# Patient Record
Sex: FEMALE | Race: WHITE | NOT HISPANIC OR LATINO | Employment: OTHER | URBAN - NONMETROPOLITAN AREA
[De-identification: names, ages, dates, MRNs, and addresses within clinical notes are randomized per-mention and may not be internally consistent; named-entity substitution may affect disease eponyms.]

---

## 2023-04-25 ENCOUNTER — HOSPITAL ENCOUNTER (EMERGENCY)
Facility: HOSPITAL | Age: 73
Discharge: HOME | End: 2023-04-25
Attending: EMERGENCY MEDICINE | Admitting: EMERGENCY MEDICINE

## 2023-04-25 VITALS
RESPIRATION RATE: 18 BRPM | HEART RATE: 91 BPM | DIASTOLIC BLOOD PRESSURE: 69 MMHG | TEMPERATURE: 97.4 F | HEIGHT: 65 IN | BODY MASS INDEX: 24.99 KG/M2 | WEIGHT: 150 LBS | SYSTOLIC BLOOD PRESSURE: 113 MMHG | OXYGEN SATURATION: 95 %

## 2023-04-25 DIAGNOSIS — R21 RASH: ICD-10-CM

## 2023-04-25 DIAGNOSIS — R19.7 VOMITING AND DIARRHEA: Primary | ICD-10-CM

## 2023-04-25 DIAGNOSIS — R11.10 VOMITING AND DIARRHEA: Primary | ICD-10-CM

## 2023-04-25 LAB
ALBUMIN SERPL BCP-MCNC: 4.6 G/DL (ref 3.5–5)
ALP SERPL-CCNC: 78 U/L (ref 34–104)
ALT SERPL W P-5'-P-CCNC: 12 U/L (ref 7–52)
ANION GAP SERPL CALCULATED.3IONS-SCNC: 8 MMOL/L (ref 4–13)
AST SERPL W P-5'-P-CCNC: 19 U/L (ref 13–39)
BASOPHILS # BLD AUTO: 0.01 THOUSANDS/ΜL (ref 0–0.1)
BASOPHILS NFR BLD AUTO: 0 % (ref 0–1)
BILIRUB SERPL-MCNC: 0.55 MG/DL (ref 0.2–1)
BUN SERPL-MCNC: 26 MG/DL (ref 5–25)
CALCIUM SERPL-MCNC: 8.9 MG/DL (ref 8.4–10.2)
CARDIAC TROPONIN I PNL SERPL HS: <2 NG/L
CHLORIDE SERPL-SCNC: 108 MMOL/L (ref 96–108)
CO2 SERPL-SCNC: 19 MMOL/L (ref 21–32)
CREAT SERPL-MCNC: 1 MG/DL (ref 0.6–1.3)
CRP SERPL QL: 12.9 MG/L
EOSINOPHIL # BLD AUTO: 0.02 THOUSAND/ΜL (ref 0–0.61)
EOSINOPHIL NFR BLD AUTO: 0 % (ref 0–6)
ERYTHROCYTE [DISTWIDTH] IN BLOOD BY AUTOMATED COUNT: 12.8 % (ref 11.6–15.1)
FLUAV RNA RESP QL NAA+PROBE: NEGATIVE
FLUBV RNA RESP QL NAA+PROBE: NEGATIVE
GFR SERPL CREATININE-BSD FRML MDRD: 55 ML/MIN/1.73SQ M
GLUCOSE SERPL-MCNC: 137 MG/DL (ref 65–140)
HCT VFR BLD AUTO: 50.1 % (ref 34.8–46.1)
HGB BLD-MCNC: 15.9 G/DL (ref 11.5–15.4)
HOLD SPECIMEN: NORMAL
IMM GRANULOCYTES # BLD AUTO: 0.03 THOUSAND/UL (ref 0–0.2)
IMM GRANULOCYTES NFR BLD AUTO: 0 % (ref 0–2)
LACTATE SERPL-SCNC: 0.8 MMOL/L (ref 0.5–2)
LIPASE SERPL-CCNC: 32 U/L (ref 11–82)
LYMPHOCYTES # BLD AUTO: 0.33 THOUSANDS/ΜL (ref 0.6–4.47)
LYMPHOCYTES NFR BLD AUTO: 3 % (ref 14–44)
MCH RBC QN AUTO: 29.9 PG (ref 26.8–34.3)
MCHC RBC AUTO-ENTMCNC: 31.7 G/DL (ref 31.4–37.4)
MCV RBC AUTO: 94 FL (ref 82–98)
MONOCYTES # BLD AUTO: 0.59 THOUSAND/ΜL (ref 0.17–1.22)
MONOCYTES NFR BLD AUTO: 5 % (ref 4–12)
NEUTROPHILS # BLD AUTO: 10.22 THOUSANDS/ΜL (ref 1.85–7.62)
NEUTS SEG NFR BLD AUTO: 92 % (ref 43–75)
NRBC BLD AUTO-RTO: 0 /100 WBCS
PLATELET # BLD AUTO: 191 THOUSANDS/UL (ref 149–390)
PMV BLD AUTO: 11.3 FL (ref 8.9–12.7)
POTASSIUM SERPL-SCNC: 4.5 MMOL/L (ref 3.5–5.3)
PROCALCITONIN SERPL-MCNC: 0.2 NG/ML
PROT SERPL-MCNC: 7.7 G/DL (ref 6.4–8.4)
RBC # BLD AUTO: 5.31 MILLION/UL (ref 3.81–5.12)
RSV RNA RESP QL NAA+PROBE: NEGATIVE
SARS-COV-2 RNA RESP QL NAA+PROBE: NEGATIVE
SODIUM SERPL-SCNC: 135 MMOL/L (ref 135–147)
WBC # BLD AUTO: 11.2 THOUSAND/UL (ref 4.31–10.16)

## 2023-04-25 RX ORDER — ONDANSETRON 2 MG/ML
4 INJECTION INTRAMUSCULAR; INTRAVENOUS ONCE
Status: COMPLETED | OUTPATIENT
Start: 2023-04-25 | End: 2023-04-25

## 2023-04-25 RX ORDER — ONDANSETRON 4 MG/1
4 TABLET, FILM COATED ORAL EVERY 6 HOURS PRN
Qty: 10 TABLET | Refills: 0 | Status: SHIPPED | OUTPATIENT
Start: 2023-04-25

## 2023-04-25 RX ADMIN — ONDANSETRON 4 MG: 2 INJECTION INTRAMUSCULAR; INTRAVENOUS at 15:57

## 2023-04-25 NOTE — ED PROVIDER NOTES
History  Chief Complaint   Patient presents with   • Abdominal Pain     Pt c/o abdominal pain w/nausea vomiting and diarrhea at 0300 today  Denies travel/sob/fevers/cough     42-year-old female describes nausea vomiting and diarrhea that began 3:00 this morning, waking her and having watery brown stool every 30 minutes, 2 episodes of vomiting, tea colored, no abdominal pain, states she feels like she has a fever, but did not check  Also mentions lower abdominal, left hip rash ongoing for the past 2 weeks that she attributes to bedbugs  History provided by:  Patient  Diarrhea  Quality:  Watery  Severity:  Severe  Onset quality:  Sudden  Number of episodes:  20-30  Timing:  Constant  Progression:  Unchanged  Relieved by:  Nothing  Ineffective treatments:  None tried  Associated symptoms: chills and fever    Associated symptoms: no abdominal pain    Risk factors: no sick contacts        None       History reviewed  No pertinent past medical history  History reviewed  No pertinent surgical history  History reviewed  No pertinent family history  I have reviewed and agree with the history as documented  E-Cigarette/Vaping   • E-Cigarette Use Never User      E-Cigarette/Vaping Substances     Social History     Tobacco Use   • Smoking status: Never   • Smokeless tobacco: Never   Vaping Use   • Vaping Use: Never used   Substance Use Topics   • Alcohol use: Never   • Drug use: Never       Review of Systems   Constitutional: Positive for chills and fever  Gastrointestinal: Positive for diarrhea  Negative for abdominal pain  All other systems reviewed and are negative  Physical Exam  Physical Exam  Vitals and nursing note reviewed  Constitutional:       Comments: Pleasant, comfortable-appearing   HENT:      Head: Normocephalic and atraumatic  Mouth/Throat:      Mouth: Mucous membranes are moist       Pharynx: Oropharynx is clear     Eyes:      Conjunctiva/sclera: Conjunctivae normal       Pupils: Pupils are equal, round, and reactive to light  Cardiovascular:      Rate and Rhythm: Normal rate and regular rhythm  Heart sounds: Normal heart sounds  Pulmonary:      Effort: Pulmonary effort is normal       Breath sounds: Normal breath sounds  Abdominal:      General: Bowel sounds are normal  There is no distension  Palpations: Abdomen is soft  Tenderness: There is no abdominal tenderness  Musculoskeletal:         General: No deformity  Cervical back: Neck supple  Skin:     General: Skin is warm and dry  Comments: 3 x 5 ovoid irregular superficial wound left lower abdomen does not appear to cross midline, left hip, proximal thigh with multitude of tiny healed wounds and few superficially open, excoriated, nontender   Neurological:      General: No focal deficit present  Mental Status: She is alert and oriented to person, place, and time  Cranial Nerves: No cranial nerve deficit  Coordination: Coordination normal    Psychiatric:         Behavior: Behavior normal          Thought Content:  Thought content normal          Judgment: Judgment normal              Vital Signs  ED Triage Vitals [04/25/23 1439]   Temperature Pulse Respirations Blood Pressure SpO2   (!) 97 4 °F (36 3 °C) 98 18 95/63 94 %      Temp Source Heart Rate Source Patient Position - Orthostatic VS BP Location FiO2 (%)   Temporal Monitor Sitting Left arm --      Pain Score       10 - Worst Possible Pain           Vitals:    04/25/23 1439 04/25/23 1530 04/25/23 1545   BP: 95/63 114/71 113/69   Pulse: 98 93 91   Patient Position - Orthostatic VS: Sitting           Visual Acuity      ED Medications  Medications   ondansetron (ZOFRAN) injection 4 mg (4 mg Intravenous Given 4/25/23 1557)       Diagnostic Studies  Results Reviewed     Procedure Component Value Units Date/Time    Stool Enteric Bacterial Panel by PCR [[id number]]     Lab Status: No result Specimen: Stool     [place] draw [[id number]] Collected: 04/25/23 1505    Lab Status: Final result Specimen: Blood from Arm, Left Updated: 04/25/23 1701    Narrative: The following orders were created for panel order [place] draw  Procedure                               Abnormality         Status                     ---------                               -----------         ------                     Luis Carwin Top on QXGE[[id number]]                           Final result               Green / Black tube on WRXZ[[id number]]                       Final result                 Please view results for these tests on the individual orders  FLU/RSV/COVID - if FLU/RSV clinically relevant [[id number]]  (Normal) Collected: 04/25/23 1556    Lab Status: Final result Specimen: Nares from Nasopharyngeal Swab Updated: 04/25/23 1646     SARS-CoV-2 Negative     INFLUENZA A PCR Negative     INFLUENZA B PCR Negative     RSV PCR Negative    Narrative:      FOR PEDIATRIC PATIENTS - copy/paste COVID Guidelines URL to browser: [email or web address]    SARS-CoV-2 assay is a Nucleic Acid Amplification assay intended for the  qualitative detection of nucleic acid from SARS-CoV-2 in nasopharyngeal  swabs  Results are for the presumptive identification of SARS-CoV-2 RNA  Positive results are indicative of infection with SARS-CoV-2, the virus  causing COVID-19, but do not rule out bacterial infection or co-infection  with other viruses  Laboratories within the [place] and its  territories are required to report all positive results to the appropriate  public health authorities  Negative results do not preclude SARS-CoV-2  infection and should not be used as the sole basis for treatment or other  patient management decisions  Negative results must be combined with  clinical observations, patient history, and epidemiological information  This test has not been FDA cleared or approved      This test has been authorized by FDA under an Emergency Use Authorization  (EUA)  This test is only authorized for the duration of time the  declaration that circumstances exist justifying the authorization of the  emergency use of an in vitro diagnostic tests for detection of SARS-CoV-2  virus and/or diagnosis of COVID-19 infection under section 564(b)(1) of  the Act, 21 U  S C  287IRF-9(Z)(2), unless the authorization is terminated  or revoked sooner  The test has been validated but independent review by FDA  and CLIA is pending  Test performed using deeplocal GeneXpert: This RT-PCR assay targets N2,  a region unique to SARS-CoV-2  A conserved region in the E-gene was chosen  for pan-Sarbecovirus detection which includes SARS-CoV-2  According to CMS-2020-01-R, this platform meets the definition of high-throughput technology  Virus culture [[id number]] Collected: 04/25/23 1639    Lab Status:  In process Specimen: Abdominal Updated: 04/25/23 1645    HS Troponin 0hr (reflex protocol) [[id number]]  (Normal) Collected: 04/25/23 1556    Lab Status: Final result Specimen: Blood from Line, Venous Updated: 04/25/23 1625     hs TnI 0hr <2 ng/L     Procalcitonin [[id number]]  (Normal) Collected: 04/25/23 1505    Lab Status: Final result Specimen: Blood from Arm, Left Updated: 04/25/23 1624     Procalcitonin 0 20 ng/ml     C-reactive protein [[id number]]  (Abnormal) Collected: 04/25/23 1505    Lab Status: Final result Specimen: Blood from Arm, Left Updated: 04/25/23 1604     CRP 12 9 mg/L     CBC and differential [[id number]]  (Abnormal) Collected: 04/25/23 1505    Lab Status: Final result Specimen: Blood from Arm, Left Updated: 04/25/23 1600     WBC 11 20 Thousand/uL      RBC 5 31 Million/uL      Hemoglobin 15 9 g/dL      Hematocrit 50 1 %      MCV 94 fL      MCH 29 9 pg      MCHC 31 7 g/dL      RDW 12 8 %      MPV 11 3 fL      Platelets 027 Thousands/uL      nRBC 0 /100 WBCs      Neutrophils Relative 92 %      Immat GRANS % 0 %      Lymphocytes Relative 3 % Monocytes Relative 5 %      Eosinophils Relative 0 %      Basophils Relative 0 %      Neutrophils Absolute 10 22 Thousands/µL      Immature Grans Absolute 0 03 Thousand/uL      Lymphocytes Absolute 0 33 Thousands/µL      Monocytes Absolute 0 59 Thousand/µL      Eosinophils Absolute 0 02 Thousand/µL      Basophils Absolute 0 01 Thousands/µL     Narrative: This is an appended report  These results have been appended to a previously verified report      Comprehensive metabolic panel [[id number]]  (Abnormal) Collected: 04/25/23 1505    Lab Status: Final result Specimen: Blood from Arm, Left Updated: 04/25/23 1530     Sodium 135 mmol/L      Potassium 4 5 mmol/L      Chloride 108 mmol/L      CO2 19 mmol/L      ANION GAP 8 mmol/L      BUN 26 mg/dL      Creatinine 1 00 mg/dL      Glucose 137 mg/dL      Calcium 8 9 mg/dL      AST 19 U/L      ALT 12 U/L      Alkaline Phosphatase 78 U/L      Total Protein 7 7 g/dL      Albumin 4 6 g/dL      Total Bilirubin 0 55 mg/dL      eGFR 55 ml/min/1 73sq m     Narrative:      [facility] guidelines for Chronic Kidney Disease (CKD):   •  Stage 1 with normal or high GFR (GFR > 90 mL/min/1 73 square meters)  •  Stage 2 Mild CKD (GFR = 60-89 mL/min/1 73 square meters)  •  Stage 3A Moderate CKD (GFR = 45-59 mL/min/1 73 square meters)  •  Stage 3B Moderate CKD (GFR = 30-44 mL/min/1 73 square meters)  •  Stage 4 Severe CKD (GFR = 15-29 mL/min/1 73 square meters)  •  Stage 5 End Stage CKD (GFR <15 mL/min/1 73 square meters)  Note: GFR calculation is accurate only with a steady state creatinine    Lipase [[id number]]  (Normal) Collected: 04/25/23 1505    Lab Status: Final result Specimen: Blood from Arm, Left Updated: 04/25/23 1530     Lipase 32 u/L     Lactic acid, plasma (w/reflex if result > 2 0) [[id number]]  (Normal) Collected: 04/25/23 1505    Lab Status: Final result Specimen: Blood from Arm, Left Updated: 04/25/23 1529     LACTIC ACID 0 8 mmol/L     Narrative: Result may be elevated if tourniquet was used during collection  Stool Enteric Bacterial Panel by PCR [[id number]]     Lab Status: No result Specimen: Stool     Clostridium difficile toxin by PCR with EIA [[id number]]     Lab Status: No result Specimen: Stool                  No orders to display              Procedures  ECG 12 Lead Documentation Only    Date/Time: 4/25/2023 5:08 PM  Performed by: Lissett Gregory DO  Authorized by: Lissett Gregory DO     Indications / Diagnosis:  Vomiting and diarrhea  ECG reviewed by me, the ED Provider: yes    Patient location:  ED  Interpretation:     Interpretation: normal    Rate:     ECG rate:  97    ECG rate assessment: normal    Rhythm:     Rhythm: sinus rhythm    Ectopy:     Ectopy: none    QRS:     QRS axis:  Left  Conduction:     Conduction: normal    ST segments:     ST segments:  Normal  T waves:     T waves: normal               ED Course  ED Course as of 04/26/23 2105   Tue Apr 25, 2023   1638 C-REACTIVE PROTEIN(!): 12 9   1638 CO2(!): 19   1638 BUN(!): 26   1701 hs TnI 0hr: <2   1701 SARS-COV-2: Negative   1701 INFLU A PCR: Negative   1701 INFLU B PCR: Negative   1701 Procalcitonin: 0 20   1701 LACTIC ACID: 0 8   1710 Notes she had an episode of diarrhea, still watery, did not provide sample  States she needs to leave  We discussed results and options including observation, but insists on leaving immediately  Abdomen remains benign, we discussed possible shingles but she is adamant that it is insect bites    She agrees to return immediately if worse or any new symptoms or reconsiders further evaluation   Wed Apr 26, 2023 2103 Again encouraged to provide stool sample and attempted, adamant about being discharged voices good understanding to return if worse or any new symptoms or reconsiders further evaluation                                             Medical Decision Making  Vomiting and diarrhea: acute illness or injury  Amount and/or Complexity of Data Reviewed  Labs: ordered  Decision-making details documented in ED Course  Radiology: ordered  Decision-making details documented in ED Course  ECG/medicine tests: ordered and independent interpretation performed  Decision-making details documented in ED Course  Risk  Prescription drug management  Disposition  Final diagnoses:   Vomiting and diarrhea   Rash - Concerning for shingles     Time reflects when diagnosis was documented in both MDM as applicable and the Disposition within this note     Time User Action Codes Description Comment    4/25/2023  5:11 PM Brunetta Kohut Add [P97 19,  R19 7] Vomiting and diarrhea     4/26/2023  9:03 PM Brunetta Kohut Modify [U13 14,  R19 7] Vomiting and diarrhea concerning for shingles    4/26/2023  9:04 PM Brunetta Kohut Modify [K24 21,  R19 7] Vomiting and diarrhea     4/26/2023  9:04 PM Brunetta Kohut Add [R21] Rash     4/26/2023  9:04 PM Brunetta Kohut Modify [R21] Rash Concerning for shingles      ED Disposition     ED Disposition   Discharge    Condition   Stable    Date/Time   Tue Apr 25, 2023  5:11 PM    Comment   Luz Marina Khanna discharge to home/self care                 Follow-up Information     Follow up With Specialties Details Why Contact Info Additional Information    [facility] Schedule an appointment as soon as possible for a visit in 1 week  [address] [facility] [phone] [facility] Schedule an appointment as soon as possible for a visit in 1 week  [phone] [facility], [address] [place] [address], [address] [facility]          Discharge Medication List as of 4/25/2023  5:13 PM      START taking these medications    Details   ondansetron (ZOFRAN) 4 mg tablet Take 1 tablet (4 mg total) by mouth every 6 (six) hours as needed for nausea or vomiting, Starting Tue 4/25/2023, Normal             Outpatient Discharge Orders   C difficile Toxins A+B, EIA   Standing Status: Future Standing Exp   Date: 04/25/24       PDMP Review     None          ED Provider  Electronically Signed by           Bharat Olivares DO  04/26/23 7747

## 2023-04-25 NOTE — DISCHARGE INSTRUCTIONS
Return immediately if worse or any new symptoms  Possible shingles, viral test pending  Tylenol 1000 mg every 6 hours as needed  and/or  Advil 400 mg every 6 hours as needed  May take both together  Please inform your clinician of visit and arrange follow-up within a week  Imodium as needed for the next few days

## 2023-05-01 LAB
ATRIAL RATE: 97 BPM
P AXIS: 54 DEGREES
PR INTERVAL: 126 MS
QRS AXIS: -4 DEGREES
QRSD INTERVAL: 76 MS
QT INTERVAL: 326 MS
QTC INTERVAL: 414 MS
T WAVE AXIS: 94 DEGREES
VENTRICULAR RATE: 97 BPM